# Patient Record
Sex: FEMALE | Race: BLACK OR AFRICAN AMERICAN | NOT HISPANIC OR LATINO | Employment: FULL TIME | URBAN - METROPOLITAN AREA
[De-identification: names, ages, dates, MRNs, and addresses within clinical notes are randomized per-mention and may not be internally consistent; named-entity substitution may affect disease eponyms.]

---

## 2024-10-01 ENCOUNTER — APPOINTMENT (OUTPATIENT)
Dept: RADIOLOGY | Facility: HOSPITAL | Age: 39
End: 2024-10-01
Payer: COMMERCIAL

## 2024-10-01 ENCOUNTER — HOSPITAL ENCOUNTER (EMERGENCY)
Facility: HOSPITAL | Age: 39
Discharge: HOME | End: 2024-10-01
Payer: COMMERCIAL

## 2024-10-01 VITALS
OXYGEN SATURATION: 98 % | RESPIRATION RATE: 18 BRPM | WEIGHT: 150 LBS | DIASTOLIC BLOOD PRESSURE: 118 MMHG | SYSTOLIC BLOOD PRESSURE: 183 MMHG | HEIGHT: 60 IN | HEART RATE: 82 BPM | BODY MASS INDEX: 29.45 KG/M2 | TEMPERATURE: 98.8 F

## 2024-10-01 DIAGNOSIS — M79.671 RIGHT FOOT PAIN: Primary | ICD-10-CM

## 2024-10-01 DIAGNOSIS — I10 PRIMARY HYPERTENSION: ICD-10-CM

## 2024-10-01 PROCEDURE — 73630 X-RAY EXAM OF FOOT: CPT | Mod: RT

## 2024-10-01 PROCEDURE — 99283 EMERGENCY DEPT VISIT LOW MDM: CPT

## 2024-10-01 PROCEDURE — 73630 X-RAY EXAM OF FOOT: CPT | Mod: RIGHT SIDE | Performed by: STUDENT IN AN ORGANIZED HEALTH CARE EDUCATION/TRAINING PROGRAM

## 2024-10-01 RX ORDER — TIZANIDINE 4 MG/1
4 TABLET ORAL EVERY 6 HOURS PRN
Qty: 30 TABLET | Refills: 0 | Status: SHIPPED | OUTPATIENT
Start: 2024-10-01 | End: 2024-10-11

## 2024-10-01 ASSESSMENT — PAIN DESCRIPTION - LOCATION: LOCATION: FOOT

## 2024-10-01 ASSESSMENT — PAIN - FUNCTIONAL ASSESSMENT: PAIN_FUNCTIONAL_ASSESSMENT: 0-10

## 2024-10-01 ASSESSMENT — COLUMBIA-SUICIDE SEVERITY RATING SCALE - C-SSRS
6. HAVE YOU EVER DONE ANYTHING, STARTED TO DO ANYTHING, OR PREPARED TO DO ANYTHING TO END YOUR LIFE?: NO
1. IN THE PAST MONTH, HAVE YOU WISHED YOU WERE DEAD OR WISHED YOU COULD GO TO SLEEP AND NOT WAKE UP?: NO
2. HAVE YOU ACTUALLY HAD ANY THOUGHTS OF KILLING YOURSELF?: NO

## 2024-10-01 ASSESSMENT — PAIN SCALES - GENERAL: PAINLEVEL_OUTOF10: 8

## 2024-10-01 NOTE — ED PROVIDER NOTES
[facility]  [facility]  ED  Encounter Note  Admit Date/RoomTime: 10/1/2024  7:26 PM  ED Room: SBOVFRG13/BTCCE17H  NAME: Ranjeet Lewis  : 1985  MRN: [id number]     Chief Complaint:  Toe Injury (Right)    HISTORY OF PRESENT ILLNESS        Ranjeet Lewis is a 39 y.o. female who presents to the ED for evaluation of one week hx of right 4th and 5th toes painful after running into a wall. Also has pain near right 1st toe.  Painful to walk. No prior hx of trauma. BP elevated.  No calf pain or tenderness. No blood thinners.     ROS   Pertinent positives and negatives are stated within HPI, all other systems reviewed and are negative.    Past Medical History:  has a past medical history of Other conditions influencing health status, Other specified health status, Personal history of diseases of the blood and blood-forming organs and certain disorders involving the immune mechanism, Personal history of other diseases of the circulatory system, Type A blood, Rh positive, and Urinary tract infection, site not specified (2013).    Surgical History:  has a past surgical history that includes Other surgical history (2014); Dilation and curettage of uterus (2014); and Colposcopy (2014).    Social History:  reports that she has never smoked. She has never used smokeless tobacco.    Family History: family history is not on file.     Allergies: Lisinopril    PHYSICAL EXAM   Oxygen Saturation Interpretation: Normal.    Physical Exam  Constitutional/General: Alert and oriented x3, well appearing, non toxic  HEENT:  NC/NT. PERRLA.  Airway patent.  Neck: Supple, full ROM. No midline vertebral tenderness or crepitus.   Respiratory: Lung sounds clear to auscultation bilaterally. No wheezes, rhonchi or stridor. Not in respiratory distress.  CV:  Regular rate. Regular rhythm. No murmurs or rubs. 2+ distal pulses.  GI:  Abdomen soft, non-tender, non-distended. +BS. No rebound, guarding,  or rigidity. No pulsatile masses.  Musculoskeletal: Moves all extremities x 4. Warm and well perfused. Capillary refill <3 seconds  Integument: Skin warm and dry. No rashes.   Neurologic: Alert and oriented with no focal deficits, symmetric strength 5/5 in the upper and lower extremities bilaterally.  Psychiatric: Normal affect.//right 1st 4th 5th toes painful    Lab / Imaging Results   (All laboratory and radiology results have been personally reviewed by myself)      Imaging:  All Radiology results interpreted by Radiologist unless otherwise noted.  XR foot right 3+ views   Final Result   No fracture or dislocation.             MACRO:   None        Signed by: Dave Simons 10/1/2024 8:04 PM   Dictation workstation:   [id number]          ED Course / Medical Decision Making   Medications - No data to display  Diagnoses as of 10/02/24 2008   Right foot pain   Primary hypertension         MDM:        Ranjeet Lewis is a 39 y.o. female who presents to the ED for evaluation of one week hx of right 4th and 5th toes painful after running into a wall. Also has pain near right 1st toe.  Painful to walk. No prior hx of trauma. BP elevated.  No calf pain or tenderness. No blood thinners.     ED course  Pt reports multiple trials of bp meds and stated that she feel she has intolerance to all of them. Ongoing bp for some time. Would like referral to specialist to discuss. Never has cp sob headache. Discussed risks of long term untreated bp including stroke heart disease and kidney issues.  Will referral to renal per request  Also referred to podiatry for food pain, injury. Also has left great toe hammertoe would like to discuss correction.  Elevate ice can use tylenol. Could not recommend ibuprofen nsaids alleve, etc. Due to high blood pressure. Can use muscle relaxers as prescribed below.  Wear good shoes. Does not desire crutches at this time.  Ddx: foot injury/pain; htn    Plan of Care/Counseling:  I reviewed today's  visit with the patient in addition to providing specific details for the plan of care and counseling regarding the diagnosis and prognosis.  Questions are answered at this time and are agreeable with the plan.    ASSESSMENT     1. Right foot pain    2. Primary hypertension      PLAN   Home Referral Orthopedics and renal doctor, Advised to return for signs of head injury, weakness, numbness or tingling to extremities, incontinence, and Advised to return for worsening or additional problems such as abdominal or chest pain  Diagnostic tests were reviewed and questions answered. Diagnosis, care plan and treatment options were discussed. The patient understand instructions and will follow up as directed.  Condition stable  The patient was given verbal follow-up instructions  Patient condition is stable    New Medications     New Medications Ordered This Visit   Medications    tiZANidine (Zanaflex) 4 mg tablet     Sig: Take 1 tablet (4 mg) by mouth every 6 hours if needed for muscle spasms for up to 10 days.     Dispense:  30 tablet     Refill:  0     Electronically signed by SHERI Pretty     **This report was transcribed using voice recognition software. Every effort was made to ensure accuracy; however, inadvertent computerized transcription errors may be present.  END OF ED PROVIDER NOTE     SHERI Pretty  10/02/24 2008

## 2024-10-01 NOTE — Clinical Note
Ranjeet Lewis was seen and treated in our emergency department on 10/1/2024.  She may return to work on 10/03/2024.       If you have any questions or concerns, please don't hesitate to call.      Sadie Bill, APRN-CNP

## 2024-10-01 NOTE — ED TRIAGE NOTES
Patient presents to the ED with c/o right 4 & 5 toe injury done last Sunday.  She has continued to have swelling and now states the medial aspect of her foot is now hurting as well.

## 2024-10-02 NOTE — DISCHARGE INSTRUCTIONS
Elevate ice foot  Wear good shoes, use tylenol for pain    See Dr Mendelzoon in followup    High blood pressure - appt with renal